# Patient Record
Sex: FEMALE | Race: WHITE | NOT HISPANIC OR LATINO | URBAN - METROPOLITAN AREA
[De-identification: names, ages, dates, MRNs, and addresses within clinical notes are randomized per-mention and may not be internally consistent; named-entity substitution may affect disease eponyms.]

---

## 2017-02-24 ENCOUNTER — COMMUNICATION - HEALTHEAST (OUTPATIENT)
Dept: ADMINISTRATIVE | Facility: HOSPITAL | Age: 67
End: 2017-02-24

## 2017-03-15 ENCOUNTER — HOSPITAL ENCOUNTER (OUTPATIENT)
Dept: BONE DENSITY | Facility: HOSPITAL | Age: 67
Discharge: HOME | End: 2017-03-15

## 2017-03-15 DIAGNOSIS — C50.412 BREAST CANCER OF UPPER-OUTER QUADRANT OF LEFT FEMALE BREAST (H): ICD-10-CM

## 2017-03-15 DIAGNOSIS — Z78.0 ASYMPTOMATIC MENOPAUSAL STATE: ICD-10-CM

## 2017-03-20 ENCOUNTER — COMMUNICATION - HEALTHEAST (OUTPATIENT)
Dept: ONCOLOGY | Facility: HOSPITAL | Age: 67
End: 2017-03-20

## 2017-03-29 ENCOUNTER — OFFICE VISIT - HEALTHEAST (OUTPATIENT)
Dept: ONCOLOGY | Facility: HOSPITAL | Age: 67
End: 2017-03-29

## 2017-03-29 DIAGNOSIS — C50.412 BREAST CANCER OF UPPER-OUTER QUADRANT OF LEFT FEMALE BREAST (H): ICD-10-CM

## 2017-03-29 RX ORDER — NYSTATIN 100000/G
POWDER (GRAM) TOPICAL 2 TIMES DAILY
Status: SHIPPED | COMMUNITY
Start: 2017-03-29

## 2017-03-29 RX ORDER — LOSARTAN POTASSIUM 50 MG/1
50 TABLET ORAL DAILY
Status: SHIPPED | COMMUNITY
Start: 2017-03-29

## 2017-09-19 ENCOUNTER — COMMUNICATION - HEALTHEAST (OUTPATIENT)
Dept: ONCOLOGY | Facility: HOSPITAL | Age: 67
End: 2017-09-19

## 2017-09-19 DIAGNOSIS — Z12.31 ENCOUNTER FOR SCREENING MAMMOGRAM FOR MALIGNANT NEOPLASM OF BREAST: ICD-10-CM

## 2017-09-19 DIAGNOSIS — C50.412 BREAST CANCER OF UPPER-OUTER QUADRANT OF LEFT FEMALE BREAST (H): ICD-10-CM

## 2017-10-11 ENCOUNTER — HOSPITAL ENCOUNTER (OUTPATIENT)
Dept: MAMMOGRAPHY | Facility: HOSPITAL | Age: 67
Discharge: HOME | End: 2017-10-11

## 2017-10-11 DIAGNOSIS — C50.412 BREAST CANCER OF UPPER-OUTER QUADRANT OF LEFT FEMALE BREAST (H): ICD-10-CM

## 2017-10-11 DIAGNOSIS — Z12.31 ENCOUNTER FOR SCREENING MAMMOGRAM FOR MALIGNANT NEOPLASM OF BREAST: ICD-10-CM

## 2018-02-19 ENCOUNTER — RECORDS - HEALTHEAST (OUTPATIENT)
Dept: LAB | Facility: CLINIC | Age: 68
End: 2018-02-19

## 2018-02-20 LAB
ANION GAP SERPL CALCULATED.3IONS-SCNC: 8 MMOL/L (ref 5–18)
BUN SERPL-MCNC: 14 MG/DL (ref 8–22)
CALCIUM SERPL-MCNC: 10.2 MG/DL (ref 8.5–10.5)
CHLORIDE BLD-SCNC: 102 MMOL/L (ref 98–107)
CO2 SERPL-SCNC: 28 MMOL/L (ref 22–31)
CREAT SERPL-MCNC: 0.71 MG/DL (ref 0.6–1.1)
ERYTHROCYTE [DISTWIDTH] IN BLOOD BY AUTOMATED COUNT: 12.9 % (ref 11–14.5)
GFR SERPL CREATININE-BSD FRML MDRD: >60 ML/MIN/1.73M2
GLUCOSE BLD-MCNC: 103 MG/DL (ref 70–125)
HCT VFR BLD AUTO: 38.4 % (ref 35–47)
HGB BLD-MCNC: 13 G/DL (ref 12–16)
MCH RBC QN AUTO: 31.9 PG (ref 27–34)
MCHC RBC AUTO-ENTMCNC: 33.9 G/DL (ref 32–36)
MCV RBC AUTO: 94 FL (ref 80–100)
PLATELET # BLD AUTO: 241 THOU/UL (ref 140–440)
PMV BLD AUTO: 10.5 FL (ref 8.5–12.5)
POTASSIUM BLD-SCNC: 3.9 MMOL/L (ref 3.5–5)
RBC # BLD AUTO: 4.08 MILL/UL (ref 3.8–5.4)
SODIUM SERPL-SCNC: 138 MMOL/L (ref 136–145)
WBC: 7.9 THOU/UL (ref 4–11)

## 2018-03-01 ENCOUNTER — COMMUNICATION - HEALTHEAST (OUTPATIENT)
Dept: ADMINISTRATIVE | Facility: HOSPITAL | Age: 68
End: 2018-03-01

## 2018-03-30 ENCOUNTER — AMBULATORY - HEALTHEAST (OUTPATIENT)
Dept: INFUSION THERAPY | Facility: HOSPITAL | Age: 68
End: 2018-03-30

## 2018-03-30 ENCOUNTER — OFFICE VISIT - HEALTHEAST (OUTPATIENT)
Dept: ONCOLOGY | Facility: HOSPITAL | Age: 68
End: 2018-03-30

## 2018-03-30 DIAGNOSIS — Z17.0 MALIGNANT NEOPLASM OF UPPER-OUTER QUADRANT OF LEFT BREAST IN FEMALE, ESTROGEN RECEPTOR POSITIVE (H): ICD-10-CM

## 2018-03-30 DIAGNOSIS — C50.412 MALIGNANT NEOPLASM OF UPPER-OUTER QUADRANT OF LEFT BREAST IN FEMALE, ESTROGEN RECEPTOR POSITIVE (H): ICD-10-CM

## 2018-03-30 DIAGNOSIS — C50.412 BREAST CANCER OF UPPER-OUTER QUADRANT OF LEFT FEMALE BREAST (H): ICD-10-CM

## 2018-03-30 DIAGNOSIS — F03.90 DEMENTIA (H): ICD-10-CM

## 2018-03-30 LAB
ALBUMIN SERPL-MCNC: 3.8 G/DL (ref 3.5–5)
ALP SERPL-CCNC: 61 U/L (ref 45–120)
ALT SERPL W P-5'-P-CCNC: 13 U/L (ref 0–45)
ANION GAP SERPL CALCULATED.3IONS-SCNC: 12 MMOL/L (ref 5–18)
AST SERPL W P-5'-P-CCNC: 18 U/L (ref 0–40)
BASOPHILS # BLD AUTO: 0 THOU/UL (ref 0–0.2)
BASOPHILS NFR BLD AUTO: 1 % (ref 0–2)
BILIRUB SERPL-MCNC: 0.5 MG/DL (ref 0–1)
BUN SERPL-MCNC: 28 MG/DL (ref 8–22)
CALCIUM SERPL-MCNC: 9.9 MG/DL (ref 8.5–10.5)
CHLORIDE BLD-SCNC: 105 MMOL/L (ref 98–107)
CO2 SERPL-SCNC: 24 MMOL/L (ref 22–31)
CREAT SERPL-MCNC: 0.75 MG/DL (ref 0.6–1.1)
EOSINOPHIL # BLD AUTO: 0.1 THOU/UL (ref 0–0.4)
EOSINOPHIL NFR BLD AUTO: 2 % (ref 0–6)
ERYTHROCYTE [DISTWIDTH] IN BLOOD BY AUTOMATED COUNT: 12.6 % (ref 11–14.5)
GFR SERPL CREATININE-BSD FRML MDRD: >60 ML/MIN/1.73M2
GLUCOSE BLD-MCNC: 90 MG/DL (ref 70–125)
HCT VFR BLD AUTO: 37.5 % (ref 35–47)
HGB BLD-MCNC: 12.6 G/DL (ref 12–16)
LYMPHOCYTES # BLD AUTO: 1.4 THOU/UL (ref 0.8–4.4)
LYMPHOCYTES NFR BLD AUTO: 28 % (ref 20–40)
MCH RBC QN AUTO: 31.5 PG (ref 27–34)
MCHC RBC AUTO-ENTMCNC: 33.6 G/DL (ref 32–36)
MCV RBC AUTO: 94 FL (ref 80–100)
MONOCYTES # BLD AUTO: 0.5 THOU/UL (ref 0–0.9)
MONOCYTES NFR BLD AUTO: 9 % (ref 2–10)
NEUTROPHILS # BLD AUTO: 3.2 THOU/UL (ref 2–7.7)
NEUTROPHILS NFR BLD AUTO: 61 % (ref 50–70)
PLATELET # BLD AUTO: 194 THOU/UL (ref 140–440)
PMV BLD AUTO: 9.8 FL (ref 8.5–12.5)
POTASSIUM BLD-SCNC: 3.9 MMOL/L (ref 3.5–5)
PROT SERPL-MCNC: 6.6 G/DL (ref 6–8)
RBC # BLD AUTO: 4 MILL/UL (ref 3.8–5.4)
SODIUM SERPL-SCNC: 141 MMOL/L (ref 136–145)
WBC: 5.2 THOU/UL (ref 4–11)

## 2019-04-02 ENCOUNTER — COMMUNICATION - HEALTHEAST (OUTPATIENT)
Dept: ONCOLOGY | Facility: HOSPITAL | Age: 69
End: 2019-04-02

## 2020-08-04 ENCOUNTER — RECORDS - HEALTHEAST (OUTPATIENT)
Dept: LAB | Facility: CLINIC | Age: 70
End: 2020-08-04

## 2020-08-05 LAB
ANION GAP SERPL CALCULATED.3IONS-SCNC: 7 MMOL/L (ref 5–18)
BUN SERPL-MCNC: 20 MG/DL (ref 8–28)
CALCIUM SERPL-MCNC: 9.6 MG/DL (ref 8.5–10.5)
CHLORIDE BLD-SCNC: 107 MMOL/L (ref 98–107)
CO2 SERPL-SCNC: 27 MMOL/L (ref 22–31)
CREAT SERPL-MCNC: 0.74 MG/DL (ref 0.6–1.1)
ERYTHROCYTE [DISTWIDTH] IN BLOOD BY AUTOMATED COUNT: 12.8 % (ref 11–14.5)
GFR SERPL CREATININE-BSD FRML MDRD: >60 ML/MIN/1.73M2
GLUCOSE BLD-MCNC: 93 MG/DL (ref 70–125)
HCT VFR BLD AUTO: 39.1 % (ref 35–47)
HGB BLD-MCNC: 12.7 G/DL (ref 12–16)
MCH RBC QN AUTO: 31.1 PG (ref 27–34)
MCHC RBC AUTO-ENTMCNC: 32.5 G/DL (ref 32–36)
MCV RBC AUTO: 96 FL (ref 80–100)
PLATELET # BLD AUTO: 271 THOU/UL (ref 140–440)
PMV BLD AUTO: 10 FL (ref 8.5–12.5)
POTASSIUM BLD-SCNC: 3.5 MMOL/L (ref 3.5–5)
RBC # BLD AUTO: 4.08 MILL/UL (ref 3.8–5.4)
SODIUM SERPL-SCNC: 141 MMOL/L (ref 136–145)
TSH SERPL DL<=0.005 MIU/L-ACNC: 0.52 UIU/ML (ref 0.3–5)
VIT B12 SERPL-MCNC: 371 PG/ML (ref 213–816)
WBC: 6 THOU/UL (ref 4–11)

## 2021-05-25 ENCOUNTER — RECORDS - HEALTHEAST (OUTPATIENT)
Dept: ADMINISTRATIVE | Facility: CLINIC | Age: 71
End: 2021-05-25

## 2021-05-26 ENCOUNTER — RECORDS - HEALTHEAST (OUTPATIENT)
Dept: ADMINISTRATIVE | Facility: CLINIC | Age: 71
End: 2021-05-26

## 2021-05-27 ENCOUNTER — RECORDS - HEALTHEAST (OUTPATIENT)
Dept: ADMINISTRATIVE | Facility: CLINIC | Age: 71
End: 2021-05-27

## 2021-05-27 NOTE — TELEPHONE ENCOUNTER
Called brother Arpan today to schedule Yina 1 year clinic visit. Arapn stated patient is wheelchair bound and live in memory care and he has been talking with her primary care doctor and feels it not neccessary to follow up with us anymore. I told the brother to call us if things change. He agreed. Routed message to Marilynn Esparza and Isadora Huerta

## 2021-05-28 ENCOUNTER — RECORDS - HEALTHEAST (OUTPATIENT)
Dept: ADMINISTRATIVE | Facility: CLINIC | Age: 71
End: 2021-05-28

## 2021-05-30 ENCOUNTER — RECORDS - HEALTHEAST (OUTPATIENT)
Dept: ADMINISTRATIVE | Facility: CLINIC | Age: 71
End: 2021-05-30

## 2021-05-30 VITALS — WEIGHT: 175 LBS | BODY MASS INDEX: 26.61 KG/M2

## 2021-06-01 ENCOUNTER — RECORDS - HEALTHEAST (OUTPATIENT)
Dept: ADMINISTRATIVE | Facility: CLINIC | Age: 71
End: 2021-06-01

## 2021-06-01 VITALS — BODY MASS INDEX: 23.54 KG/M2 | WEIGHT: 154.8 LBS

## 2021-06-09 NOTE — PROGRESS NOTES
[facility] Progress Note    Patient: Yina Terry  MRN: [id number]  Date of Service: 3/29/2017        Reason for visit      1. Breast cancer of upper-outer quadrant of left female breast        Assessment     1.  A 67 y.o. woman with breast cancer, left side, stage II, T1c N1 M0, ER/RI positive, lobular carcinoma, status post lumpectomy and sentinel lymph node biopsy and radiation.  She took tamoxifen for a few months and then started on letrozole.  2.  History of dementia which seems to be under control.  3.  Slight dry skin.  4.  Slight skin fungal issue.  5.  Osteopenia.     Plan     1. Since she has finished 5 years of therapy it is time to stop letrozole.   2. Follow up with me or Naomy in 12 months' time.   3. Continue with good diet, exercise as well as calcium and vitamin D.  4. Mammogram in September.    Clinical stage      Breast cancer of upper-outer quadrant of left female breast    Staging form: Breast, AJCC 7th Edition      Clinical: Stage IIA (T1c, N1, cM0, Free text: ER+, RI+, [id number]) - Signed by Kit Conley MD on 3/25/2015      Pathologic: Stage IIA (T1c, N1a, cM0) - Signed by Kit Conley MD on 3/25/2015      History     Yina Terry is a very pleasant 67 y.o. old female with a history of  breast cancer diagnosed in 12/2011 located on the left side measuring 1.7 cm in size, presenting on a mammogram completely asymptomatic, which upon biopsy was invasive lobular carcinoma, ER/RI positive, HER-2/kenisha negative, with 1 lymph node positive, making it a stage II.  She was started on tamoxifen and then stopped it for a while and then currently is on letrozole.  She has been tolerating that quite well, comes in today for scheduled followup.    Overall tolerating her medication well. Denies any new symptoms. Comes in after a bone density. Her significant medical issue is demential which is quite advanced.      Past Medical History     Past Medical History:    Diagnosis Date     Breast cancer      Dementia      Hyperlipidemia      Hypertension            Review of Systems   Constitutional  Constitutional (WDL): All constitutional elements are within defined limits  Neurosensory  Neurosensory (WDL): All neurosensory elements are within defined limits  Confusion: Moderate disorientation, limiting instrumental ADL  Cardiovascular  Cardiovascular (WDL): Exceptions to WDL  Edema: Yes  Edema Limbs: 5 - 10% inter-limb discrepancy in volume or circumference at point of greatest visible difference, swelling or obscuration of anatomic architecture on close inspection (legs)  Pulmonary  Respiratory (WDL): Within Defined Limits  Gastrointestinal  Gastrointestinal (WDL): All gastrointestinal elements are within defined limits  Genitourinary  Genitourinary (WDL): All genitourinary elements are within defined limits  Integumentary  Integumentary (WDL): All integumentary elements are within defined limits  Patient Coping  Patient Coping: Other (Comment) (difficulty answering assessment questions due to dementia)  Accompanied by  Accompanied by: Family Member    ECOG performance status and Distress Assessment      ECOG Performance:    ECOG Performance Status: 3    Distress Assessment  Distress Assessment Score: No distress:     Pain Status  Currently in Pain: No/denies        Vital Signs     Vitals:    03/29/17 0953   BP: 122/60   Pulse: 62   Temp: 98.3  F (36.8  C)   SpO2: 92%       Physical Exam     GENERAL: No acute distress. Cooperative in conversation.   HEENT: Pupils are equal, round and reactive. Oral mucosa is clean and intact. No ulcerations or mucositis noted. No bleeding noted.  RESP:Chest symmetric lungs are clear bilaterally per auscultation. Regular respiratory rate. No wheezes or rhonchi.  CV: Normal S1 S2 Regular, rate and rhythm. No murmurs.  ABD: Nondistended, soft, nontender. Positive bowel sounds. No organomegaly.   EXTREMITIES: No lower extremity edema.   NEURO:  Non- focal. Alert and oriented x3.  Cranial nerves appear intact.  PSYCH: Within normal limits. No depression or anxiety.  SKIN: Warm dry intact.  Slight fungal skin infection.  LYMPH NODES: Bilateral cervical, supraclavicular, axillary lymph node examination was done.  Negative for any palpable adenopathy.  Breast examination reveals status post lumpectomy left breast with slight induration under the scar, but otherwise overall good cosmesis. The right breast is normal. No nipple discharge.         Lab Results     Results for orders placed or performed in visit on 01/03/17   Basic Metabolic Panel   Result Value Ref Range    Sodium 143 136 - 145 mmol/L    Potassium 3.8 3.5 - 5.0 mmol/L    Chloride 109 (H) 98 - 107 mmol/L    CO2 28 22 - 31 mmol/L    Anion Gap, Calculation 6 5 - 18 mmol/L    Glucose 87 70 - 125 mg/dL    Calcium 8.7 8.5 - 10.5 mg/dL    BUN 12 8 - 22 mg/dL    Creatinine 0.70 0.60 - 1.10 mg/dL    GFR MDRD Af Amer >60 >60 mL/min/1.73m2    GFR MDRD Non Af Amer >60 >60 mL/min/1.73m2   Hemoglobin   Result Value Ref Range    Hemoglobin 12.1 12.0 - 16.0 g/dL         Imaging Results     Dxa Bone Density Scan    Result Date: 3/17/2017  BONE MINERAL DENSITY (DEXA) EXAM 3/15/2017 10:47 AM INDICATION: Post-menopausal. Breast cancer history. COMPARISON: 03/11/2015, 08/21/2013, 05/01/2009. REGION: Lumbar spine L1-L2 BMD: 1.139 g/cm sq T Score: -0.3 Z Score: 1.3 Total left proximal femur BMD: 1.039 g/cm sq T Score: 0.2 Z Score: 1.6 Left femoral neck BMD: 1.001 g/cm sq T Score: -0.3 Z Score: 1.3 Total right proximal femur BMD: 0.978 g/cm sq T Score: -0.2 Z Score: 1.1 Right femoral neck BMD: 0.896 g/cm sq T Score: -1.0 Z Score: 0.6 LUMBAR SPINE: Significant degenerative changes are present in the spine which artificially elevates the bone mineral density. The bone mineral density values demonstrate normal bone mineral density. Compared with the previous exam, there has been a statistically significant change/worsening  in the bone mineral density. There has been a change of -7.0%. TOTAL LEFT PROXIMAL FEMUR: The bone mineral density values demonstrate normal bone mineral density. Compared with the previous exam, there has not been a statistically significant change in the bone mineral density. There has been a change of 3.7%. LEFT FEMORAL NECK: The bone mineral density values demonstrate normal bone mineral density. TOTAL RIGHT PROXIMAL FEMUR: The bone mineral density values demonstrate normal bone mineral density. Compared with the previous exam, there has been a statistically significant worsening in the bone mineral density. There has been a change of -8.2%. RIGHT FEMORAL NECK: The bone mineral density values demonstrate osteopenia.     CONCLUSION: 1.  Osteopenia.        Kit Conley MD

## 2021-06-17 NOTE — PROGRESS NOTES
[facility] Hematology and Oncology Progress Note    Patient: Yina Terry  MRN: [id number]  Date of Service: 03/30/2018        Reason for Visit    Chief Complaint   Patient presents with     HE Cancer       Assessment and Plan  Breast cancer of upper-outer quadrant of left female breast    Staging form: Breast, AJCC 7th Edition    - Clinical: Stage IIA (T1c, N1, cM0, Free text: ER+, NH+, [id number]) - Signed by Kit Conley MD on 3/25/2015    - Pathologic: Stage IIA (T1c, N1a, cM0) - Signed by Kit Conley MD on 3/25/2015  1.  A 66 y.o. woman with breast cancer, left side, stage II, T1c N1 M0, ER/NH positive, lobular carcinoma, status post lumpectomy and sentinel lymph node biopsy and radiation.  She took tamoxifen for a few months and then started on letrozole. She finished 5 years in 2017. She will return in one year. Continue mammograms every SEptember.      2. Osteopenia: continue calcium and vitamin d. Encourage her to get some exercise if possible at care center.      3. Dementia: lives in care facility. No change. Brother here to assist.     ECOG Performance   ECOG Performance Status: 1     Distress Assessment  Distress Assessment Score: Unable to rate (dementia)    Pain  Currently in Pain: Unable to assess ( dementia)      Problem List    1. Dementia     2. Malignant neoplasm of upper-outer quadrant of left breast in female, estrogen receptor positive          ______________________________________________________________________________    History of Present Illness    Yina Terry is a very pleasant 65 y.o. old post menopausal female with a history of breast cancer diagnosed in 12/2011 located on the left side measuring 1.7 cm in size, presenting on a mammogram completely asymptomatic, which upon biopsy was invasive lobular carcinoma, ER/NH positive, HER-2/kenisha negative, with 1 lymph node positive, making it a stage II.  She was started on tamoxifen and then stopped it for a while  and then started on letrozole.  She competed 5 years in March 2017 so she stopped.   Denies any new symptoms. Here for follow up visit.     Pain Status  Currently in Pain: Unable to assess ( dementia)    Review of Systems  Very hard to do with dementia. Family speaks for pt.     Constitutional  Constitutional (WDL): Exceptions to WDL  Weight Loss: 10 - <20% from baseline, nutritional support indicated (down 19lbs since 3/29/17)  Neurosensory  Neurosensory (WDL): Exceptions to WDL  Confusion: Severe disorientation, limiting self care ADL  Eye   Eye Disorder (WDL): Assessment not pertinent to visit  Ear  Ear Disorder (WDL): Assessment not pertinent to visit  Cardiovascular  Cardiovascular (WDL): All cardiovascular elements are within defined limits (Normal per family)  Pulmonary  Respiratory (WDL): Within Defined Limits (Normal per family)  Gastrointestinal  Gastrointestinal (WDL): Exceptions to WDL (Per family family no issues except INC)  Genitourinary  Genitourinary (WDL): Exceptions to WDL (Per family no issues except INC)  Lymphatic     Musculoskeletal and Connective Tissue  Musculoskeletal and Connetive Tissue Disorders (WDL): Exceptions to WDL  Muscle Weakness : Symptomatic, perceived by patient but not evident on physical exam  Integumentary  Integumentary (WDL): All integumentary elements are within defined limits (Per family normal)  Patient Coping  Patient Coping: Accepting  Distress Assessment  Distress Assessment Score: Unable to rate (dementia)  Accompanied by  Accompanied by: Family Member  Oral Chemo Adherence       Past History  Past Medical History:   Diagnosis Date     Breast cancer      Dementia      Hyperlipidemia      Hypertension        PHYSICAL EXAM:  /62  Pulse 63  Temp 97.7  F (36.5  C) (Oral)   Wt 154 lb 12.8 oz (70.2 kg)  LMP 09/11/2014  BMI 23.54 kg/m2  GENERAL: no acute distress. Cooperative in conversation. Here with brother  HEENT: pupils are equal, round and reactive.  Oromucosa is clean and intact. No ulcerations or mucositis noted. No bleeding noted.  RESP: lungs are clear bilaterally per auscultation. Regular respiratory rate. No wheezes or rhonchi.  CV: Regular, rate and rhythm. No murmurs.  ABD: soft, nontender. Positive bowel sounds. No organomegaly.   MUSCULOSKELETAL: No lower extremity swelling.   PSYCH: within normal limits. No depression or anxiety.  SKIN: warm dry intact   LYMPH: no cervical, supraclavicular or axillary lymphadenopathy  BREAST: Bilateral exam done.  Lumpectomy incision is well-healed.  No abnormal lesions or rashes noted.  No evidence for local recurrence bilaterally.          Lab Results    Recent Results (from the past 168 hour(s))   Comprehensive Metabolic Panel   Result Value Ref Range    Sodium 141 136 - 145 mmol/L    Potassium 3.9 3.5 - 5.0 mmol/L    Chloride 105 98 - 107 mmol/L    CO2 24 22 - 31 mmol/L    Anion Gap, Calculation 12 5 - 18 mmol/L    Glucose 90 70 - 125 mg/dL    BUN 28 (H) 8 - 22 mg/dL    Creatinine 0.75 0.60 - 1.10 mg/dL    GFR MDRD Af Amer >60 >60 mL/min/1.73m2    GFR MDRD Non Af Amer >60 >60 mL/min/1.73m2    Bilirubin, Total 0.5 0.0 - 1.0 mg/dL    Calcium 9.9 8.5 - 10.5 mg/dL    Protein, Total 6.6 6.0 - 8.0 g/dL    Albumin 3.8 3.5 - 5.0 g/dL    Alkaline Phosphatase 61 45 - 120 U/L    AST 18 0 - 40 U/L    ALT 13 0 - 45 U/L   HM1 (CBC with Diff)   Result Value Ref Range    WBC 5.2 4.0 - 11.0 thou/uL    RBC 4.00 3.80 - 5.40 mill/uL    Hemoglobin 12.6 12.0 - 16.0 g/dL    Hematocrit 37.5 35.0 - 47.0 %    MCV 94 80 - 100 fL    MCH 31.5 27.0 - 34.0 pg    MCHC 33.6 32.0 - 36.0 g/dL    RDW 12.6 11.0 - 14.5 %    Platelets 194 140 - 440 thou/uL    MPV 9.8 8.5 - 12.5 fL    Neutrophils % 61 50 - 70 %    Lymphocytes % 28 20 - 40 %    Monocytes % 9 2 - 10 %    Eosinophils % 2 0 - 6 %    Basophils % 1 0 - 2 %    Neutrophils Absolute 3.2 2.0 - 7.7 thou/uL    Lymphocytes Absolute 1.4 0.8 - 4.4 thou/uL    Monocytes Absolute 0.5 0.0 - 0.9 thou/uL     Eosinophils Absolute 0.1 0.0 - 0.4 thou/uL    Basophils Absolute 0.0 0.0 - 0.2 thou/uL       Imaging    BILATERAL FULL FIELD DIGITAL SCREENING MAMMOGRAM      Performed on: 10/11/17     Compared to: 10/04/2016 Mammo Screening Bilateral, 09/30/2015 Mammo Screening Bilateral, 09/11/2014 Mammo Screening Bilateral, and 09/04/2013 Mammo Diagnostic Bilateral     Findings: Suboptimal positioning due to cognitive impairment. The breasts are heterogeneously dense, which may obscure small masses. There are postsurgical lumpectomy changes in the left breast. No evidence for malignancy and no change from the previous exam(s). This study was evaluated with the assistance of Computer-Aided Detection. Continue routine screening mammogram as recommended.     ACR BI-RADS Category 2: Benign Findings    Signed by: Naomy Fisher CNP

## 2021-07-03 NOTE — ADDENDUM NOTE
Addendum Note by Radha Stallworth RN at 9/19/2017 10:41 AM     Author: Radha Stallworth RN Service: -- Author Type: Registered Nurse    Filed: 9/19/2017 10:41 AM Encounter Date: 9/19/2017 Status: Signed    : Radha Stallowrth RN (Registered Nurse)    Addended by: RADHA STALLWORTH on: 9/19/2017 10:41 AM        Modules accepted: Orders

## 2021-07-13 ENCOUNTER — RECORDS - HEALTHEAST (OUTPATIENT)
Dept: ADMINISTRATIVE | Facility: CLINIC | Age: 71
End: 2021-07-13

## 2021-07-21 ENCOUNTER — RECORDS - HEALTHEAST (OUTPATIENT)
Dept: ADMINISTRATIVE | Facility: CLINIC | Age: 71
End: 2021-07-21